# Patient Record
Sex: FEMALE | Race: WHITE
[De-identification: names, ages, dates, MRNs, and addresses within clinical notes are randomized per-mention and may not be internally consistent; named-entity substitution may affect disease eponyms.]

---

## 2017-04-16 ENCOUNTER — HOSPITAL ENCOUNTER (EMERGENCY)
Dept: HOSPITAL 47 - EC | Age: 32
Discharge: HOME | End: 2017-04-16
Payer: COMMERCIAL

## 2017-04-16 VITALS
TEMPERATURE: 99.4 F | RESPIRATION RATE: 18 BRPM | SYSTOLIC BLOOD PRESSURE: 121 MMHG | DIASTOLIC BLOOD PRESSURE: 78 MMHG | HEART RATE: 93 BPM

## 2017-04-16 DIAGNOSIS — F41.9: ICD-10-CM
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
## 2017-04-16 DIAGNOSIS — S92.331A: ICD-10-CM

## 2017-04-16 DIAGNOSIS — S92.341A: ICD-10-CM

## 2017-04-16 DIAGNOSIS — S92.351A: ICD-10-CM

## 2017-04-16 DIAGNOSIS — Z88.1: ICD-10-CM

## 2017-04-16 DIAGNOSIS — S82.831A: Primary | ICD-10-CM

## 2017-04-16 PROCEDURE — 29515 APPLICATION SHORT LEG SPLINT: CPT

## 2017-04-16 PROCEDURE — 99283 EMERGENCY DEPT VISIT LOW MDM: CPT

## 2017-04-16 NOTE — ED
Lower Extremity Injury HPI





- General


Chief Complaint: Extremity Injury, Lower


Stated Complaint: rt foot injury


Time Seen by Provider: 04/16/17 15:26


Source: patient, RN notes reviewed


Mode of arrival: wheelchair


Limitations: no limitations





- History of Present Illness


Initial Comments: 





31-year-old female presents emergency Department chief complaint right foot and 

ankle injury.  Patient states she's going on the stairs slipped twisting her 

ankle and injuring her distal foot.  Patient states that it is very painful 

unable to ambulate.  Patient denies any head injury no LOC.  Patient offers no 

other complaints.





- Related Data


 Home Medications











 Medication  Instructions  Recorded  Confirmed


 


Omeprazole [Omeprazole] 20 mg PO DAILY PRN 12/16/15 03/14/16


 


Sertraline [Zoloft] 50 mg PO HS 03/09/16 03/14/16








 Previous Rx's











 Medication  Instructions  Recorded


 


Acetaminophen-Codeine 300-30mg 2 tab PO Q6H PRN #30 tablet 03/14/16





[Tylenol #3]  


 


Hydrocodone/Acetaminophen [Norco 1 tab PO Q6HR PRN #20 tab 04/16/17





5-325]  











 Allergies











Allergy/AdvReac Type Severity Reaction Status Date / Time


 


nitrofurantoin AdvReac  numbness,ti Verified 04/16/17 15:22





[From Macrobid]   ngling  


 


nitrofurantoin AdvReac  numbness,ti Verified 04/16/17 15:22





macrocrystalline   ngling  





[From Macrobid]     














Review of Systems


ROS Statement: 


Those systems with pertinent positive or pertinent negative responses have been 

documented in the HPI.





ROS Other: All systems not noted in ROS Statement are negative.





Past Medical History


Past Medical History: No Reported History


History of Any Multi-Drug Resistant Organisms: MRSA


Date of last positivie culture/infection: 2009


MDRO Source:: right groin


Past Surgical History: Tubal Ligation


Past Psychological History: Anxiety


Smoking Status: Never smoker


Past Alcohol Use History: Rare


Past Drug Use History: None Reported





- Past Family History


  ** Mother


Additional Family Medical History / Comment(s): hypoglycemia





General Exam


Limitations: no limitations


General appearance: alert, in no apparent distress


Respiratory exam: Present: normal lung sounds bilaterally.  Absent: respiratory 

distress, wheezes, rales, rhonchi, stridor


Cardiovascular Exam: Present: regular rate, normal rhythm, normal heart sounds.

  Absent: systolic murmur, diastolic murmur, rubs, gallop, clicks


Extremities exam: Present: other (Right foot there is tenderness to the distal 

aspect, no obvious deformity mild swelling no ecchymosis neurovascular intact 

tenderness over right lateral malleolus)





Course





 Vital Signs











  04/16/17





  15:19


 


Temperature 99.4 F


 


Pulse Rate 93


 


Respiratory 18





Rate 


 


Blood Pressure 121/78


 


O2 Sat by Pulse 99





Oximetry 














Procedures





- Orthopedic Splinting/Casting


  ** Injury #1


Side: right


Lower Extremity Injury Location: ankle, foot


Lower Extremity Immobilizer: posterior splint


Other Orthopedic Equipment: crutches


Additional Comments: 





Patient's leg was neurovascularly intact before and after procedure





Medical Decision Making





- Medical Decision Making





31-year-old female presents emergency department for right foot and ankle 

injury.  Patient has fracture of her second third and fourth digit along with 

right fibula fracture.  Patient was placed on short leg splint.  Patient has no 

evidence of Lisfranc fracture.  Patient will be discharged follow-up with 

orthopedics patient was given prescription for crutches.





Disposition


Clinical Impression: 


 Foot fracture, right, Ankle fracture, right





Disposition: HOME SELF-CARE


Condition: Stable


Instructions:  Foot Fracture in Adults (ED)


Additional Instructions: 


Follow-up with orthopedics as directed.Please return to the Emergency 

Department if symptoms worsen or any other concerns.


Prescriptions: 


Hydrocodone/Acetaminophen [Norco 5-325] 1 tab PO Q6HR PRN #20 tab


 PRN Reason: Pain


Referrals: 


Robert King MD [Primary Care Provider] - 1-2 days


Jeffrey Bell MD [STAFF PHYSICIAN] - 1-2 days


Time of Disposition: 15:50

## 2017-04-16 NOTE — XR
EXAMINATION TYPE: XR foot complete RT

 

DATE OF EXAM: 4/16/2017 3:34 PM

 

COMPARISON: NONE

 

HISTORY: Fell down the stairs. Pain.

 

TECHNIQUE: 3 views

 

FINDINGS: There are transverse fractures of the necks of the third fourth and fifth metatarsal heads.
 There is no dislocation. There is a plantar calcaneal spur.

 

IMPRESSION: Multiple metatarsal head fractures.

## 2017-04-16 NOTE — XR
EXAMINATION TYPE: XR ankle complete RT

 

DATE OF EXAM: 4/16/2017 3:33 PM

 

COMPARISON: NONE

 

HISTORY: Fell down the stairs. Pain

 

TECHNIQUE: 3 views

 

FINDINGS: There is a small 5 mm nondisplaced chip fracture of the tip of the distal fibula. There is 
lateral soft tissue swelling. Ankle mortise is anatomic. There is mild calcaneal spurring.

 

IMPRESSION: Chip fracture of the distal fibula.

## 2017-04-28 ENCOUNTER — HOSPITAL ENCOUNTER (OUTPATIENT)
Dept: HOSPITAL 47 - OR | Age: 32
Discharge: HOME | End: 2017-04-28
Payer: COMMERCIAL

## 2017-04-28 VITALS — DIASTOLIC BLOOD PRESSURE: 83 MMHG | SYSTOLIC BLOOD PRESSURE: 139 MMHG | RESPIRATION RATE: 18 BRPM | HEART RATE: 122 BPM

## 2017-04-28 VITALS — BODY MASS INDEX: 38 KG/M2

## 2017-04-28 VITALS — TEMPERATURE: 99.6 F
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
## 2017-04-28 DIAGNOSIS — S92.331A: Primary | ICD-10-CM

## 2017-04-28 DIAGNOSIS — Z88.1: ICD-10-CM
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
## 2017-04-28 DIAGNOSIS — S82.64XD: ICD-10-CM

## 2017-04-28 DIAGNOSIS — S92.341A: ICD-10-CM

## 2017-04-28 DIAGNOSIS — Z79.891: ICD-10-CM

## 2017-04-28 DIAGNOSIS — Z79.1: ICD-10-CM

## 2017-04-28 DIAGNOSIS — S92.351A: ICD-10-CM

## 2017-04-28 PROCEDURE — 81025 URINE PREGNANCY TEST: CPT

## 2017-04-28 PROCEDURE — 73620 X-RAY EXAM OF FOOT: CPT

## 2017-04-28 PROCEDURE — 28476 PRQ SKEL FIX METAR FX W/MNPJ: CPT

## 2017-04-28 RX ADMIN — ONDANSETRON ONE MG: 2 INJECTION INTRAMUSCULAR; INTRAVENOUS at 11:35

## 2017-04-28 RX ADMIN — HYDROMORPHONE HYDROCHLORIDE PRN MG: 1 INJECTION, SOLUTION INTRAMUSCULAR; INTRAVENOUS; SUBCUTANEOUS at 15:28

## 2017-04-28 RX ADMIN — ONDANSETRON ONE MG: 2 INJECTION INTRAMUSCULAR; INTRAVENOUS at 15:32

## 2017-04-28 RX ADMIN — HYDROMORPHONE HYDROCHLORIDE PRN MG: 1 INJECTION, SOLUTION INTRAMUSCULAR; INTRAVENOUS; SUBCUTANEOUS at 13:35

## 2017-04-28 NOTE — P.OP
Date of Procedure: 04/28/17


Preoperative Diagnosis: 


Closed, displaced right third, fourth, and fifth metatarsal neck fractures


Postoperative Diagnosis: 


Same


Procedure(s) Performed: 


1.  Percutaneous reduction and pinning of right third metatarsal neck fracture


2.  Percutaneous reduction and pinning of right fourth metatarsal neck fracture


3.  Percutaneous reduction and pinning of right fifth metatarsal neck fracture


Anesthesia: LORE


Surgeon: Luis Brice


Assistant #1: Rosario Vela


Estimated Blood Loss (ml): 25


IV fluids (ml): 600


Pathology: none sent


Condition: stable


Disposition: PACU


Indications for Procedure: 


The patient is a 31-year-old female who was previously healthy presented to my 

office a week and a half ago following a fall down stairs.  She sustained 

isolated injuries to her right ankle and foot.  She was seen in the ER where x-

rays showed a nondisplaced distal fibula fracture and third, fourth, and fifth 

metatarsal neck fractures.  The patient was placed in a splint and follow-up 

was arranged in my office.  On review of the x-rays in our office the 

metatarsal neck fractures were angulated and displaced, particularly the fourth 

and fifth metatarsal neck fractures.  I do lengthy discussion with the patient 

and her  on treatment.  Due to the patient's young age, multiple 

metatarsal neck fractures, and angulation greater than 10 of her fourth and 

fifth metatarsal neck fracture I recommended percutaneous reduction and 

pinning.  We discussed the potential risks and complication of surgery 

including but not limited to risk of anesthesia, risk of delayed wound healing, 

risk of superficial infection, risk of deep infection, risk of pin site 

infection, risk of fracture malunion, risk of fracture nonunion, risk of 

chronic pain, risk of chronic swelling, risk of need for further surgery, and 

generalized to satisfaction with surgery.  The patient understands these risks 

and provided her verbal and written consent to go forward with surgery.


Description of Procedure: 


The patient was identified in preoperative holding and the correct operative 

extremity was marked with my initials.  The consent was reviewed and all 

questions were answered.  The patient was then brought back to the operating 

room.  She was transferred from the [facility] onto the operating room table and a 

general anesthetic was administered.  A tourniquet was applied to the proximal 

aspect of the right leg but was not inflated at all during the procedure.  All 

bony prominences were well-padded.  Preoperative antibiotics were administered.

  The patient's right leg was then prepped and draped in standard sterile 

fashion.  Prior to starting surgery timeout was performed identifying the 

correct patient, operative extremity, and procedure.  A sterile radiolucent 

triangle was placed under the knee and a stack of towels placed under the foot 

to facilitate intraoperative C-arm imaging.





I began by addressing the fifth metatarsal neck fracture.  C-arm was brought 

into marked out the distal fifth metatarsal.  A stab incision was made over the 

lateral aspect of the fifth metatarsal head.  A dental pick was placed through 

the stab incision to use as a reduction aid.  Once the fifth metatarsal head 

was aligned with the shaft a 0.0625 K wire was placed under the plantar aspect 

of the fifth toe and centered on the metatarsal head.  The K wire was driven 

through the metatarsal head, across the fracture and into the fifth metatarsal 

shaft.  The position of the K wire was checked on AP, oblique, and lateral C-

arm shots.





The fourth metatarsal neck fracture was then addressed.  A stab incision was 

made in the webspace between the fourth and fifth toe.  A dental pick was used 

to percutaneously reduce the fourth metatarsal head fragment.  Once it was 

aligned with the shaft a 0.0625 K wire was placed under the plantar aspect of 

the fourth toe and centered on the metatarsal head.  Under direct C-arm 

visualization the K wire was driven across the fracture site and into the 

fourth metatarsal shaft.  C-arm was used to verify reduction in the AP, oblique

, and lateral shots.





The third metatarsal neck fracture was then addressed.  A stab incision was 

made in the webspace between the third and fourth toe.  A dental pick was used 

to percutaneously reduce the third metatarsal head fragment.  Once it was 

aligned with the shaft a 0.0625 K wire was placed under the plantar aspect of 

the third toe and centered on the metatarsal head.  Under direct C-arm 

visualization the K wire was driven across the fracture site and into the third 

metatarsal shaft.  C-arm was used to verify reduction in the AP, oblique, and 

lateral shots.





After final C-arm shots were taken to verify reduction of the fractures and 

position of the K wires the K wires were bent and cut outside the skin.  

Protective caps were placed over all 3 K wires.  The stab incisions over the 

dorsal aspect of the foot were closed using interrupted 3-0 nylon horizontal 

mattress stitches.  A sterile dressing consisting of Betadine soaked Adaptic, 4 

x 4, and web roll was applied.  The patient was placed in a bulky Berumen type 

splint.  She was awoken from her anesthetic and transferred to the [facility].  She 

was brought to PACU in stable condition having tolerated the procedure well.

## 2017-04-28 NOTE — FL
EXAMINATION TYPE: FL guidance operating room, XR foot limited RT

 

DATE OF EXAM: 4/28/2017 2:41 PM

 

CLINICAL HISTORY: Right foot fracture third through fifth toes.

 

TECHNIQUE: Fluoroscopy. Intraoperative limited views right foot.

 

COMPARISON:  Right foot x-ray April 16, 2017..

 

FINDINGS:  Fluoroscopic guidance was provided during closed reduction external fixation procedure per
formed by Dr. Brice.  A total of 58 seconds of fluoroscopic time was utilized during the procedure
 and 2 spot intraoperative images are acquired.

 

Intraoperative images acquired show placement of 3 external K wires through oblique fracture deformit
ies of distal third through fifth metatarsals. Improved alignment is seen after reduction and fixatio
n.

 

IMPRESSION:  As Above.

## 2019-12-29 ENCOUNTER — HOSPITAL ENCOUNTER (EMERGENCY)
Dept: HOSPITAL 47 - EC | Age: 34
Discharge: HOME | End: 2019-12-29
Payer: COMMERCIAL

## 2019-12-29 VITALS — RESPIRATION RATE: 18 BRPM

## 2019-12-29 VITALS — DIASTOLIC BLOOD PRESSURE: 87 MMHG | HEART RATE: 89 BPM | SYSTOLIC BLOOD PRESSURE: 138 MMHG | TEMPERATURE: 98.1 F

## 2019-12-29 DIAGNOSIS — J40: Primary | ICD-10-CM

## 2019-12-29 DIAGNOSIS — Z88.1: ICD-10-CM

## 2019-12-29 PROCEDURE — 71046 X-RAY EXAM CHEST 2 VIEWS: CPT

## 2019-12-29 PROCEDURE — 87502 INFLUENZA DNA AMP PROBE: CPT

## 2019-12-29 PROCEDURE — 99284 EMERGENCY DEPT VISIT MOD MDM: CPT

## 2019-12-29 NOTE — ED
General Adult HPI





- General


Chief complaint: Upper Respiratory Infection


Stated complaint: URI


Time Seen by Provider: 12/29/19 02:56


Source: patient, RN notes reviewed, old records reviewed


Mode of arrival: ambulatory


Limitations: no limitations





- History of Present Illness


Initial comments: 


34-year-old female patient presents to the.  Chief complaint approximately 2 

weeks of cough, waxing and waning fevers congestion.  Patient recently completed

a course of amoxicillin for possible pneumonia.  Denies a chance of being 

pregnant secondary tubal ligation.  Denies any risk factors for PE.  The force 

that her back and her chest hurt while coughing, denies any chest pain at rest. 

Denies any other complaints at this time.





Systemic: Pt denies fatigue, fever/chills, rash. Pt denies weakness, night 

sweats, weight loss. 


Neuro: Pt denies headache, visual disturbances, syncope or pre-syncope.


HEENT: Pt denies ocular discharge or irritation, otalgia, rhinorrhea, 

pharyngitis or notable lymphadenopathy. 


Cardiopulmonary: Pt denies chest pain, SOB, heart palpitations, dyspnea on 

exertion.  


Abdominal/GI: Pt denies abdominal pain, n/v/d. 


: Pt denies dysuria, burning w/ urination, frequency/urgency. Denies new onset

urinary or bowel incontinence.  


MSK: Pt denies myalgia, loss of strength or function in extremities. 


Neuro: Pt denies new onset weakness, paresthesias. 








- Related Data


                                Home Medications











 Medication  Instructions  Recorded  Confirmed


 


Ibuprofen [Motrin] 600 mg PO Q6HR PRN 04/26/17 04/26/17








                                  Previous Rx's











 Medication  Instructions  Recorded


 


Hydrocodone/Acetaminophen [Norco 1 tab PO Q6HR PRN #20 tab 04/16/17





5-325]  


 


Docusate [Colace] 100 mg PO BID #28 capsule 04/28/17


 


HYDROcodone/APAP 5-325MG [Norco 1 - 2 tab PO Q6HR PRN #50 tab 04/28/17





5-325]  


 


Sulfamethox-Tmp 800-160Mg [Bactrim 1 tab PO Q12HR #28 tab 04/28/17





-160 mg]  


 


Albuterol Inhaler [Ventolin Hfa 1 - 2 puff INHALATION Q4-6H PRN #1 12/29/19





Inhaler] inhaler 


 


predniSONE 50 mg PO DAILY #4 tab 12/29/19











                                    Allergies











Allergy/AdvReac Type Severity Reaction Status Date / Time


 


nitrofurantoin AdvReac  numbness,ti Verified 04/26/17 09:10





[From Macrobid]   ngling  


 


nitrofurantoin AdvReac  numbness,ti Verified 04/26/17 09:10





macrocrystalline   ngling  





[From Macrobid]     














Review of Systems


ROS Statement: 


Those systems with pertinent positive or pertinent negative responses have been 

documented in the HPI.





ROS Other: All systems not noted in ROS Statement are negative.





Past Medical History


Past Medical History: GERD/Reflux, Musculoskeletal Disorder


Additional Past Medical History / Comment(s): FRACTURES 3,4,5 TOES RT FOOT.


History of Any Multi-Drug Resistant Organisms: MRSA


Date of last positivie culture/infection: 2009


MDRO Source:: right groin


Past Surgical History: Tubal Ligation


Past Anesthesia/Blood Transfusion Reactions: Family History of Problems w/ 

Anesthesia


Additional Past Anesthesia/Blood Transfusion Reaction / Comment(s): MOTHER HAS 

SEVERE PONV.


Past Psychological History: Depression


Smoking Status: Never smoker





- Past Family History


  ** Father


Family Medical History: Cancer





  ** Mother


Additional Family Medical History / Comment(s): hypoglycemia





General Exam





- General Exam Comments


Initial Comments: 


Constitutional: NAD, AOX3, Pt has pleasant affect. 


HEENT: NC/AT, trachea midline, neck supple, no lymphadenopathy. Posterior 

pharynx non erythematous, without exudates. External ears appear normal, without

discharge. Mucous membranes moist. Eyes PERRLA, EOM intact. There is no scleral 

icterus. No pallor noted. 


Cardiopulmonary: RRR, no murmurs, rubs or gallops, no JVD noted. Lungs CTAB in 

anterior and posterior fields. No peripheral edema. 


Abdominal exam: Abdomen soft and non-distended. Abdomen non-tender to palpation 

in all 4 quadrants. Bowel sounds active in LLQ. No hepatosplenomegaly. No 

ecchymosis


Neuro: CN II-XII grossly intact. No nuchal rigidity. No raccon eyes, no stafford 

sign, no hemotympanum. No cervical spinal tenderness. 


MSK: No posterior calf tenderness bilaterally, homans sign negative bilaterally.

Posterior tibialis and radial pulse +2 bilaterally. Sensation intact in upper 

and lower extremities. Full active ROM in upper and lower extremities, 5/5 

stregnth. 





Limitations: no limitations





Course





                                   Vital Signs











  12/29/19





  01:26


 


Temperature 98.6 F


 


Pulse Rate 91


 


Respiratory 18





Rate 


 


Blood Pressure 153/92


 


O2 Sat by Pulse 97





Oximetry 














Medical Decision Making





- Medical Decision Making





34-year-old female patient presents to ED for chief complaint approximately 2 

weeks of cough.  Patient will signs are stable, afebrile.  Physical exam didn't 

display acute pathology.  Lungs clear to auscultation bilaterally.  Chest x-ray 

is negative, influenza negative.  Patient is PERC negative.  Patient likely 

dealing with a viral bronchitis examined him.  We'll discharge with steroids and

when necessary breathing treatments.  Will follow up with primary care for a 

trial return to ER if condition worsens.  Case discussed with Dr. Santiago. 








- Lab Data





                                   Lab Results











  12/29/19 Range/Units





  01:30 


 


Influenza Type A RNA  Not Detected  (Not Detectd)  


 


Influenza Type B (PCR)  Not Detected  (Not Detectd)  














Disposition


Clinical Impression: 


 Cough, Bronchitis





Disposition: HOME SELF-CARE


Condition: Stable


Instructions (If sedation given, give patient instructions):  Acute Cough (ED), 

Acute Bronchitis (ED)


Additional Instructions: 





Take medications as directed.  Use inhaler as needed.  Follow-up with primary 

care provider tomorrow.  Return to ER if condition worsens.








Prescriptions: 


predniSONE 50 mg PO DAILY #4 tab


Albuterol Inhaler [Ventolin Hfa Inhaler] 1 - 2 puff INHALATION Q4-6H PRN #1 

inhaler


 PRN Reason: Cough


Is patient prescribed a controlled substance at d/c from ED?: No


Referrals: 


Robert King MD [Primary Care Provider] - 1-2 days

## 2019-12-29 NOTE — XR
EXAMINATION TYPE: XR chest 2V

 

DATE OF EXAM: 12/29/2019

 

COMPARISON: NONE

 

HISTORY: Cough

 

TECHNIQUE: 2 views

 

FINDINGS: Heart and mediastinum are normal. Lungs are clear. Diaphragm is normal. Bony thorax appears
 normal.

 

IMPRESSION: Normal chest

## 2023-06-21 ENCOUNTER — HOSPITAL ENCOUNTER (INPATIENT)
Dept: HOSPITAL 47 - EC | Age: 38
LOS: 2 days | Discharge: HOME | DRG: 53 | End: 2023-06-23
Attending: HOSPITALIST | Admitting: HOSPITALIST
Payer: COMMERCIAL

## 2023-06-21 DIAGNOSIS — Z86.14: ICD-10-CM

## 2023-06-21 DIAGNOSIS — Z88.8: ICD-10-CM

## 2023-06-21 DIAGNOSIS — G40.409: Primary | ICD-10-CM

## 2023-06-21 DIAGNOSIS — Z91.040: ICD-10-CM

## 2023-06-21 DIAGNOSIS — E66.01: ICD-10-CM

## 2023-06-21 DIAGNOSIS — S01.512A: ICD-10-CM

## 2023-06-21 DIAGNOSIS — F32.A: ICD-10-CM

## 2023-06-21 DIAGNOSIS — Z80.8: ICD-10-CM

## 2023-06-21 DIAGNOSIS — F41.9: ICD-10-CM

## 2023-06-21 DIAGNOSIS — Z88.2: ICD-10-CM

## 2023-06-21 DIAGNOSIS — Z79.899: ICD-10-CM

## 2023-06-21 LAB
ALBUMIN SERPL-MCNC: 4.8 G/DL (ref 3.5–5)
ALP SERPL-CCNC: 97 U/L (ref 38–126)
ALT SERPL-CCNC: 28 U/L (ref 4–34)
ANION GAP SERPL CALC-SCNC: 13 MMOL/L
APAP SPEC-MCNC: <10 UG/ML
AST SERPL-CCNC: 31 U/L (ref 14–36)
BASOPHILS # BLD AUTO: 0.1 K/UL (ref 0–0.2)
BASOPHILS NFR BLD AUTO: 0 %
BUN SERPL-SCNC: 16 MG/DL (ref 7–17)
CALCIUM SPEC-MCNC: 9.5 MG/DL (ref 8.4–10.2)
CHLORIDE SERPL-SCNC: 103 MMOL/L (ref 98–107)
CO2 SERPL-SCNC: 23 MMOL/L (ref 22–30)
EOSINOPHIL # BLD AUTO: 0.1 K/UL (ref 0–0.7)
EOSINOPHIL NFR BLD AUTO: 1 %
ERYTHROCYTE [DISTWIDTH] IN BLOOD BY AUTOMATED COUNT: 5.06 M/UL (ref 3.8–5.4)
ERYTHROCYTE [DISTWIDTH] IN BLOOD: 13.6 % (ref 11.5–15.5)
GLUCOSE SERPL-MCNC: 98 MG/DL (ref 74–99)
HCT VFR BLD AUTO: 43.1 % (ref 34–46)
HGB BLD-MCNC: 14.5 GM/DL (ref 11.4–16)
LYMPHOCYTES # SPEC AUTO: 1.5 K/UL (ref 1–4.8)
LYMPHOCYTES NFR SPEC AUTO: 11 %
MAGNESIUM SPEC-SCNC: 2.3 MG/DL (ref 1.6–2.3)
MCH RBC QN AUTO: 28.6 PG (ref 25–35)
MCHC RBC AUTO-ENTMCNC: 33.6 G/DL (ref 31–37)
MCV RBC AUTO: 85.2 FL (ref 80–100)
MONOCYTES # BLD AUTO: 0.3 K/UL (ref 0–1)
MONOCYTES NFR BLD AUTO: 2 %
NEUTROPHILS # BLD AUTO: 12.1 K/UL (ref 1.3–7.7)
NEUTROPHILS NFR BLD AUTO: 86 %
PLATELET # BLD AUTO: 250 K/UL (ref 150–450)
POTASSIUM SERPL-SCNC: 3.9 MMOL/L (ref 3.5–5.1)
PROT SERPL-MCNC: 8.2 G/DL (ref 6.3–8.2)
SALICYLATES SERPL-MCNC: <1 MG/DL
SODIUM SERPL-SCNC: 139 MMOL/L (ref 137–145)
WBC # BLD AUTO: 14 K/UL (ref 3.8–10.6)

## 2023-06-21 PROCEDURE — 80143 DRUG ASSAY ACETAMINOPHEN: CPT

## 2023-06-21 PROCEDURE — 70553 MRI BRAIN STEM W/O & W/DYE: CPT

## 2023-06-21 PROCEDURE — 80053 COMPREHEN METABOLIC PANEL: CPT

## 2023-06-21 PROCEDURE — 93005 ELECTROCARDIOGRAM TRACING: CPT

## 2023-06-21 PROCEDURE — 80320 DRUG SCREEN QUANTALCOHOLS: CPT

## 2023-06-21 PROCEDURE — 80306 DRUG TEST PRSMV INSTRMNT: CPT

## 2023-06-21 PROCEDURE — 70450 CT HEAD/BRAIN W/O DYE: CPT

## 2023-06-21 PROCEDURE — 36415 COLL VENOUS BLD VENIPUNCTURE: CPT

## 2023-06-21 PROCEDURE — 83735 ASSAY OF MAGNESIUM: CPT

## 2023-06-21 PROCEDURE — 84100 ASSAY OF PHOSPHORUS: CPT

## 2023-06-21 PROCEDURE — 85025 COMPLETE CBC W/AUTO DIFF WBC: CPT

## 2023-06-21 PROCEDURE — 95816 EEG AWAKE AND DROWSY: CPT

## 2023-06-21 PROCEDURE — 80179 DRUG ASSAY SALICYLATE: CPT

## 2023-06-21 RX ADMIN — ACETAMINOPHEN PRN MG: 325 TABLET, FILM COATED ORAL at 23:28

## 2023-06-21 RX ADMIN — CEFAZOLIN SCH MLS/HR: 330 INJECTION, POWDER, FOR SOLUTION INTRAMUSCULAR; INTRAVENOUS at 22:16

## 2023-06-21 NOTE — CT
EXAMINATION TYPE: CT brain wo con

CT DLP: 1098.4 mGycm, Automated exposure control for dose reduction was used.

 

DATE OF EXAM: 6/21/2023 6:25 PM

 

COMPARISON: None.

 

CLINICAL INDICATION:Female, 37 years old with history of seizure activity, Seizure activity

 

TECHNIQUE: 

Brain: Axial CT images of the brain were obtained with coronal and sagittal reformats created and rev
iewed.

Contrast used: None.

Oral contrast used: None.

 

FINDINGS:

 

Brain:

Extra-axial spaces: No abnormal extra-axial fluid collections.

Ventricular system: Within normal limits

Cerebral parenchyma: No acute intraparenchymal hemorrhage or mass effect.  The gray-white junction is
 well differentiated.     

Cerebellum: Unremarkable.

Mass effect: No evidence of midline shift.

Intracranial vasculature: unremarkable

Soft tissues: Normal.

Calvarium/osseous structures: No depressed skull fracture.

Paranasal sinuses and mastoid air cells: Mild scattered paranasal sinus disease.

Visualized orbits: Orbital contents are intact.

 

 

IMPRESSION:

No acute intracranial process.

## 2023-06-21 NOTE — ED
Seizure HPI





- General


Chief Complaint: Seizure


Stated Complaint: seizure


Time Seen by Provider: 06/21/23 17:26


Source: patient, RN notes reviewed, old records reviewed


Mode of arrival: ambulatory


Limitations: no limitations





- History of Present Illness


Initial Comments: 





This is a 37-year-old female DF for evaluation patient resents today for 

evaluation regards to seizure history of no prior seizures.  Patient presents 

today for first seizure, patient is on multiple medications that could lead her 

to have seizures per patient.  Patient has no other complaints.  Patient has had

some voluntary or involuntary movements lately.  Family medical with concern for

possible seizures.  Patient didn't bite her tongue today, seizure was witnessed 

by her son


MD Complaint: seizure, shaking


-: minutes(s)


Description of Episode: loss of consciousness, tonic-clonic movement, post-event

confusion, other (Tongue laceration)


-: minutes(s)


Witnessed: yes - by bystander


Trauma: Yes


Seizure History: none


Possible Precipitating Event: none


Associated Symptoms: confusion


Treatments Prior to Arrival: none





- Related Data


                                Home Medications











 Medication  Instructions  Recorded  Confirmed


 


buPROPion XL [Wellbutrin XL] 150 mg PO HS 10/31/22 06/21/23


 


Albuterol Inhaler [Ventolin Hfa 1 - 2 puff INHALATION RT-Q6H PRN 06/21/23 06/21/23





Inhaler]   


 


Cetirizine HCl 10 mg PO HS 06/21/23 06/21/23


 


FLUoxetine HCL [PROzac] 40 mg PO HS 06/21/23 06/21/23


 


Montelukast [Singulair] 10 mg PO HS 06/21/23 06/21/23


 


Semaglutide [Wegovy] 0.5 mg SQ FR 06/21/23 06/21/23











                                    Allergies











Allergy/AdvReac Type Severity Reaction Status Date / Time


 


sulfamethoxazole Allergy  Rash/Hives Verified 06/21/23 19:51





[From Bactrim]     


 


trimethoprim [From Bactrim] Allergy  Rash/Hives Verified 06/21/23 19:51


 


latex AdvReac  Itching Verified 06/21/23 19:51


 


nitrofurantoin AdvReac  numbness,ti Verified 06/21/23 19:51





[From Macrobid]   ngling  


 


nitrofurantoin AdvReac  numbness,ti Verified 06/21/23 19:51





macrocrystalline   ngling  





[From Macrobid]     














Review of Systems


ROS Statement: 


Those systems with pertinent positive or pertinent negative responses have been 

documented in the HPI.





ROS Other: All systems not noted in ROS Statement are negative.





Past Medical History


Past Medical History: GERD/Reflux, Musculoskeletal Disorder


Additional Past Medical History / Comment(s): FRACTURES 3,4,5 TOES RT FOOT.


History of Any Multi-Drug Resistant Organisms: MRSA


Date of last positivie culture/infection: 2009


MDRO Source:: right groin


Past Surgical History: Orthopedic Surgery, Tubal Ligation


Additional Past Surgical History / Comment(s): reconstrution of rt foot


Past Anesthesia/Blood Transfusion Reactions: Family History of Problems w/ 

Anesthesia


Additional Past Anesthesia/Blood Transfusion Reaction / Comment(s): MOTHER HAS 

SEVERE PONV.


Past Psychological History: Anxiety, Depression


Smoking Status: Never smoker





- Past Family History


  ** Father


Family Medical History: Cancer





  ** Mother


Additional Family Medical History / Comment(s): hypoglycemia





General Exam


Limitations: no limitations


General appearance: alert, in no apparent distress


Head exam: Present: atraumatic, normocephalic, normal inspection


Eye exam: Present: normal appearance, PERRL, EOMI.  Absent: scleral icterus, 

conjunctival injection, periorbital swelling


ENT exam: Present: normal exam, mucous membranes moist


Neck exam: Present: normal inspection.  Absent: tenderness, meningismus, lym

phadenopathy


Respiratory exam: Present: normal lung sounds bilaterally.  Absent: respiratory 

distress, wheezes, rales, rhonchi, stridor


Cardiovascular Exam: Present: regular rate, normal rhythm, normal heart sounds. 

Absent: systolic murmur, diastolic murmur, rubs, gallop, clicks


GI/Abdominal exam: Present: soft, normal bowel sounds.  Absent: distended, 

tenderness, guarding, rebound, rigid


Extremities exam: Present: normal inspection, full ROM, normal capillary refill.

 Absent: tenderness, pedal edema, joint swelling, calf tenderness


Back exam: Present: normal inspection


Neurological exam: Present: alert, oriented X3, CN II-XII intact


Psychiatric exam: Present: normal affect, normal mood


Skin exam: Present: warm, dry, intact, normal color.  Absent: rash





Course


                                   Vital Signs











  06/21/23 06/21/23 06/21/23





  15:11 17:38 22:19


 


Temperature 97.4 F L 98.6 F 98.5 F


 


Pulse Rate 117 H 108 H 97


 


Respiratory 20 18 16





Rate   


 


Blood Pressure 161/87 146/90 109/60


 


O2 Sat by Pulse 99 98 98





Oximetry   














- Reevaluation(s)


Reevaluation #1: 





06/21/23 22:44


Medical records reviewed


Reevaluation #2: 





06/21/23 22:44


No recurrent seizures


Reevaluation #3: 





06/21/23 22:44


Patient informed results questions have been answered


Reevaluation #4: 





06/21/23 22:44


Was pt. sent in by a medical professional or institution?


@  -no


Did you speak to anyone other than the patient for history?  


@  -no


Did you review nursing and triage notes? 


@  -agree


Were old charts reviewed? 


@  -no


Differential Diagnosis? 


@  -prior


EKG interpreted by me (3pts min.)?


@  -yes


X-rays interpreted by me (1pt min.)?


@  -no


CT interpreted by me (1pt min.)?


@  -no


U/S interpreted by me (1pt. min.)?


@  -no


What testing was considered but not performed? (CT, X-rays, U/S, labs)? Why?


@  -no


What meds were considered but not given? Why?


@  -no


Did you discuss the management of the patient with other professionals?


@  -no


Did you reconcile home meds?


@  -no


Was smoking cessation discussed for >3mins.?


@  -no


Was critical care preformed (if so, how long)?


@  -no


Were there social determinants of health that impacted care today? How? 

(Homelessness, low income, unemployed, alcoholism, drug addiction, 

transportation, low edu. Level, literacy, decrease access to med. care, senior living, 

rehab)?


@  -no


Was there de-escalation of care discussed even if they declined? (Discuss DNR or

withdrawal of care, Hospice)?


@  -no


What co-morbidities impacted this encounter? (DM, HTN, Smoking, COPD, CAD, 

Cancer, CVA, Hep., AIDS, mental health diagnosis, sleep apnea, morbid obesity)?


@  -none


Was patient admitted / discharged?


@  -


Undiagnosed new problem with uncertain prognosis?


@  -no


Drug Therapy requiring intensive monitoring for toxicity (Heparin, Nitro, 

Insulin, Cardizem)?


@  -no


Were any procedures done?


@  -no


Diagnosis/symptom?


@  -


Acute, or Chronic, or Acute on Chronic?


@  -no


Uncomplicated (without systemic symptoms) or Complicated (systemic symptoms)?


@  -uncomplicated


Side effects of treatment?


@  -no


Exacerbation, Progression, or Severe Exacerbation]


@  -no


Poses a threat to life or bodily function?


@  -no 


Reevaluation #5: 





06/21/23 22:44


Differential Seizure:


Recurrent seizure disorder, febrile seizure, alcohol withdrawal, stimulants, 

meningitis, encephalitis, intercranial hemorrhage, intracranial tumor, stroke, 

eclampsia, thyrotoxicosis, hypocalcemia, hyponatremia, hypernatremia, 

hypomagnesemia, psychogenic, this is not meant to be an all-inclusive list. 





Medical Decision Making





- Medical Decision Making





37 female DEL with new onset seizure we'll admit for neurology consultation





- Lab Data


Result diagrams: 


                                 06/21/23 18:29





                                 06/21/23 18:29


                                   Lab Results











  06/21/23 06/21/23 06/21/23 Range/Units





  18:29 18:29 18:29 


 


WBC  14.0 H    (3.8-10.6)  k/uL


 


RBC  5.06    (3.80-5.40)  m/uL


 


Hgb  14.5    (11.4-16.0)  gm/dL


 


Hct  43.1    (34.0-46.0)  %


 


MCV  85.2    (80.0-100.0)  fL


 


MCH  28.6    (25.0-35.0)  pg


 


MCHC  33.6    (31.0-37.0)  g/dL


 


RDW  13.6    (11.5-15.5)  %


 


Plt Count  250    (150-450)  k/uL


 


MPV  7.3    


 


Neutrophils %  86    %


 


Lymphocytes %  11    %


 


Monocytes %  2    %


 


Eosinophils %  1    %


 


Basophils %  0    %


 


Neutrophils #  12.1 H    (1.3-7.7)  k/uL


 


Lymphocytes #  1.5    (1.0-4.8)  k/uL


 


Monocytes #  0.3    (0-1.0)  k/uL


 


Eosinophils #  0.1    (0-0.7)  k/uL


 


Basophils #  0.1    (0-0.2)  k/uL


 


Sodium    139  (137-145)  mmol/L


 


Potassium    3.9  (3.5-5.1)  mmol/L


 


Chloride    103  ()  mmol/L


 


Carbon Dioxide    23  (22-30)  mmol/L


 


Anion Gap    13  mmol/L


 


BUN    16  (7-17)  mg/dL


 


Creatinine    0.86  (0.52-1.04)  mg/dL


 


Est GFR (CKD-EPI)AfAm    >90  (>60 ml/min/1.73 sqM)  


 


Est GFR (CKD-EPI)NonAf    89  (>60 ml/min/1.73 sqM)  


 


Glucose    98  (74-99)  mg/dL


 


Calcium    9.5  (8.4-10.2)  mg/dL


 


Magnesium    2.3  (1.6-2.3)  mg/dL


 


Total Bilirubin    0.4  (0.2-1.3)  mg/dL


 


AST    31  (14-36)  U/L


 


ALT    28  (4-34)  U/L


 


Alkaline Phosphatase    97  ()  U/L


 


Total Protein    8.2  (6.3-8.2)  g/dL


 


Albumin    4.8  (3.5-5.0)  g/dL


 


Salicylates    <1.0  mg/dL


 


Urine Opiates Screen   Not Detected   (NotDetected)  


 


Ur Oxycodone Screen   Not Detected   (NotDetected)  


 


Urine Methadone Screen   Not Detected   (NotDetected)  


 


Ur Propoxyphene Screen   Not Detected   (NotDetected)  


 


Acetaminophen    <10.0  ug/mL


 


Ur Barbiturates Screen   Not Detected   (NotDetected)  


 


U Tricyclic Antidepress   Not Detected   (NotDetected)  


 


Ur Phencyclidine Scrn   Not Detected   (NotDetected)  


 


Ur Amphetamines Screen   Not Detected   (NotDetected)  


 


U Methamphetamines Scrn   Not Detected   (NotDetected)  


 


U Benzodiazepines Scrn   Detected H   (NotDetected)  


 


Urine Cocaine Screen   Not Detected   (NotDetected)  


 


U Marijuana (THC) Screen   Not Detected   (NotDetected)  


 


Serum Alcohol    <10  mg/dL














- EKG Data


-: EKG Interpreted by Me (EKG sinus tach 107.   QRS 96 QTc 412)





- Radiology Data


Radiology results: report reviewed (CT brain is negative for acute disease), 

image reviewed





Disposition


Clinical Impression: 


 New onset seizure





Disposition: ADMITTED AS IP TO THIS HOSP


Condition: Good


Is patient prescribed a controlled substance at d/c from ED?: No


Time of Disposition: 21:40

## 2023-06-22 LAB
ALBUMIN SERPL-MCNC: 3.7 G/DL (ref 3.5–5)
ALP SERPL-CCNC: 79 U/L (ref 38–126)
ALT SERPL-CCNC: 24 U/L (ref 4–34)
ANION GAP SERPL CALC-SCNC: 9 MMOL/L
AST SERPL-CCNC: 36 U/L (ref 14–36)
BASOPHILS # BLD AUTO: 0 K/UL (ref 0–0.2)
BASOPHILS NFR BLD AUTO: 0 %
BUN SERPL-SCNC: 12 MG/DL (ref 7–17)
CALCIUM SPEC-MCNC: 8.5 MG/DL (ref 8.4–10.2)
CHLORIDE SERPL-SCNC: 105 MMOL/L (ref 98–107)
CO2 SERPL-SCNC: 24 MMOL/L (ref 22–30)
EOSINOPHIL # BLD AUTO: 0.1 K/UL (ref 0–0.7)
EOSINOPHIL NFR BLD AUTO: 1 %
ERYTHROCYTE [DISTWIDTH] IN BLOOD BY AUTOMATED COUNT: 4.53 M/UL (ref 3.8–5.4)
ERYTHROCYTE [DISTWIDTH] IN BLOOD: 13.7 % (ref 11.5–15.5)
GLUCOSE SERPL-MCNC: 86 MG/DL (ref 74–99)
HCT VFR BLD AUTO: 39.2 % (ref 34–46)
HGB BLD-MCNC: 13.2 GM/DL (ref 11.4–16)
LYMPHOCYTES # SPEC AUTO: 2.7 K/UL (ref 1–4.8)
LYMPHOCYTES NFR SPEC AUTO: 30 %
MAGNESIUM SPEC-SCNC: 2.2 MG/DL (ref 1.6–2.3)
MCH RBC QN AUTO: 29.1 PG (ref 25–35)
MCHC RBC AUTO-ENTMCNC: 33.7 G/DL (ref 31–37)
MCV RBC AUTO: 86.5 FL (ref 80–100)
MONOCYTES # BLD AUTO: 0.3 K/UL (ref 0–1)
MONOCYTES NFR BLD AUTO: 3 %
NEUTROPHILS # BLD AUTO: 5.8 K/UL (ref 1.3–7.7)
NEUTROPHILS NFR BLD AUTO: 64 %
PLATELET # BLD AUTO: 174 K/UL (ref 150–450)
POTASSIUM SERPL-SCNC: 3.8 MMOL/L (ref 3.5–5.1)
PROT SERPL-MCNC: 6.4 G/DL (ref 6.3–8.2)
SODIUM SERPL-SCNC: 138 MMOL/L (ref 137–145)
WBC # BLD AUTO: 9 K/UL (ref 3.8–10.6)

## 2023-06-22 RX ADMIN — CEFAZOLIN SCH: 330 INJECTION, POWDER, FOR SOLUTION INTRAMUSCULAR; INTRAVENOUS at 05:31

## 2023-06-22 RX ADMIN — ACETAMINOPHEN PRN MG: 325 TABLET, FILM COATED ORAL at 22:36

## 2023-06-22 RX ADMIN — CEFAZOLIN SCH: 330 INJECTION, POWDER, FOR SOLUTION INTRAMUSCULAR; INTRAVENOUS at 23:21

## 2023-06-22 NOTE — P.CN
Psychiatric Consult





- .


Consult date: 06/22/23


Consult:: 





06/22/23 13:45


IDENTIFYING DATA: This patient is a , unemployed, 37-year-old  

female with significant history of depression who presents to the hospital on 

06/21/2023 for new onset seizure.





HISTORY OF PRESENT ILLNESS: The patient presented to the hospital on 06/21/2023,

after a new onset seizure.  The patient reported that at approximately 2:40 PM 

yesterday, she felt that she passed out.  However she came to in a few minutes 

with blood on her chin and blood in her tongue after a reported seizure.  The 

patient reports that when she came to, her daughter was worried and expressed 

concern that "davonte was dying."  The patient denies any enuresis.  She does 

report some postictal confusion. 


The patient reports that this is her first seizure.  She denies any history of 

substance abuse.  She reports no history of head trauma. 


In regards to her mood, the patient reports that she does have a significant 

history of depression that started after her brother passed away in 2018.  

However, she does not report any significant symptoms of depression at this 

time.  She remains future and goal oriented with a strong desire to live for 

herself and for her family.  She is denying any suicidal or homicidal ideation, 

intention, and/or plan.  She reports no prior attempts at suicide.  She denies 

any auditory or visual hallucinations.  She denies any paranoia or other 

delusions.





PAST PSYCHIATRIC HISTORY: Patient has a history of depression.  Patient is 

currently prescribed regimen of Prozac and Wellbutrin.  Patient denies any 

previous psychiatric hospitalizations. Patient denies any psychiatric outpatient

follow-up. Patient denies any history of suicide attempts in the past.





PAST MEDICAL HISTORY:  


Past Medical History: GERD/Reflux, Musculoskeletal Disorder


Additional Past Medical History / Comment(s): FRACTURES 3,4,5 TOES RT FOOT.


History of Any Multi-Drug Resistant Organisms: MRSA


Date of last positivie culture/infection: 2009


MDRO Source:: right groin


Past Surgical History: Orthopedic Surgery, Tubal Ligation


Additional Past Surgical History / Comment(s): reconstrution of rt foot


Past Anesthesia/Blood Transfusion Reactions: Family History of Problems w/ 

Anesthesia


Additional Past Anesthesia/Blood Transfusion Reaction / Comment(s): MOTHER HAS 

SEVERE PONV.


Past Psychological History: Anxiety, Depression


Smoking Status: Never smoker








ALLERGIES: 


                                    Allergies











Allergy/AdvReac Type Severity Reaction Status Date / Time


 


sulfamethoxazole Allergy  Rash/Hives Verified 06/21/23 19:51





[From Bactrim]     


 


trimethoprim [From Bactrim] Allergy  Rash/Hives Verified 06/21/23 19:51


 


latex AdvReac  Itching Verified 06/21/23 19:51


 


nitrofurantoin AdvReac  numbness,ti Verified 06/21/23 19:51





[From Macrobid]   ngling  


 


nitrofurantoin AdvReac  numbness,ti Verified 06/21/23 19:51





macrocrystalline   ngling  





[From Macrobid]     











CHEMICAL DEPENDENCY HISTORY: The patient denies any tobacco, significant 

alcohol, marijuana, or illicit drug use.





FAMILY PSYCHIATRIC/SUBSTANCE USE HISTORY: The patient reports that her mother 

had a psychotic break after the death of her father.  No other reported 

psychiatric history.





SOCIAL HISTORY: Patient was born in [place] and raised in [place].  She is 

 to her  Luis for the past 11 years.  They have 3 children 

together ages 15, 9, and 7.





MENTAL STATUS EXAM: 


General Appearance: Patient appears to be stated age is alert, pleasant, and 

cooperative. Patient appears to have fair hygiene and grooming wearing hospital 

gown with fair eye contact.


Behavior: Patient is calmly lying in bed without any agitated behavior.


Speech: Patient's speech is fluent and nonpressured. 


Mood/Affect: Patient reports their mood is "doing pretty good", affect is 

congruent and euthymic


Suicidality/Homicidality:  Patient denies any suicidal or homicidal ideation, 

intention, and/or plan.


Perceptions: Patient denies any visual hallucinations and denies any auditory 

hallucinations  


Though content/process: There is no evidence of any delusional thought content 

and thought process is linear and goal-directed. 


Memory and concentration: AOX3, grossly intact for the purposes of this session.

Can spell "WORLD" backwards


Judgment and insight: Fair





IMPRESSIONS: 


Major depressive disorder


New-onset seizure suspect secondary to Wellbutrin





PLAN: 


-At this time patient DOES NOT meet criteria for inpatient psychiatric 

admission.  Patient does not present an imminent risk of harm to self or others.

 She is not overtly manic or psychotic.


-Would recommend the following medication changes/additions: 


After discussion with the patient, we have agreed to discontinue Wellbutrin due 

to its risk of lowering seizure threshold.  The patient does not wish to start 

any new medication to augment her antidepressant at this time.  She elected to 

trial being on Prozac alone.


Continue Prozac 40 mg by mouth at bedtime for depression/anxiety


-Patient does not require one-to-one sitter. 


-Psychiatry will sign off at this point, please contact with any questions.








06/22/23 13:45

## 2023-06-22 NOTE — HP
HISTORY AND PHYSICAL



CHIEF COMPLAINT:

Seizure disorder.



HISTORY OF PRESENT ILLNESS:

This is a 37-year-old woman with a past medical history of GERD, was noted to have some

jerking reaction by the family yesterday.  At that time, her  was at work.  The

patient had generalized tonic-clonic seizure.  The patient bit the tongue and was

bleeding.  The patient came to [facility] and was admitted for further

evaluation and treatment.  There is no history of any fever, rigors, or chills at this

time.



Dr. King in the outpatient setting was cutting down the dose of bupropion and as well as

Prozac.



PAST MEDICAL HISTORY:

Include MRSA and GERD.  Rest of the history and rest of the chart are also reviewed.



HOME MEDICATIONS:

Reviewed include Singulair.  Rest of the medications are noted.



ALLERGIES:

Reviewed include Bactrim.  Rest of the allergies are noted.



FAMILY HISTORY:

History of hypoglycemia.



SOCIAL HISTORY:

No history of smoking.  Occasional alcohol intake.



REVIEW OF SYSTEMS:

Fourteen-point review is negative except as mentioned earlier.



PHYSICAL EXAMINATION:

VITAL SIGNS:  Pulse 84, blood pressure 135/80, respirations 15.

HEENT:  Conjunctivae are normal.

NECK:  No jugular venous distention.

CARDIOVASCULAR:  S1 and S2 muffled.

RESPIRATORY:  Breath sounds diminished at the bases.



ABDOMEN:  Soft and nontender.

LEGS:  No edema.  No swelling.

NERVOUS SYSTEM:  No focal deficits.

SKIN:  No ulcers or rashes.

JOINTS:  No active deforming arthropathy.



LABORATORY DATA:

Noted.



ASSESSMENT:

1. Acute tonic-clonic seizure disorder.

2. Increased WBC, possibly reactive.

3. History of methicillin-resistant Staphylococcus aureus.

4. Anxiety and depression.

5. Multiple medical issues.



RECOMMENDATIONS AND DISCUSSION:

In this 37-year-old woman presented with multiple complex medical issues, we will

monitor the patient closely along with Neurology and as well as Psychiatry.  Keppra has

been initiated.  EEG.  Monitor closely.  Prognosis is guarded.  Further recommendations

to follow.





MMODL / IJN: [id number] / Job#: [id number]

## 2023-06-22 NOTE — EEG
ELECTROENCEPHALOGRAM REPORT



CLINICAL HISTORY:

This is a 37-year-old woman, who presented to the ED because of seizure-like 
activity

on 06/21/2023 witnessed by her son.  The video EEG is obtained to evaluate for 
seizure

epileptiform activity.



RELEVANT MEDICATION:

The patient is not on any antiseizure medication.



EEG TYPE:

A routine 21-channel EEG is performed with video using the 10/20 electrode 
placement

system.



DESCRIPTION:

Wakefulness is only obtained.  During awake state, the posterior-dominant rhythm

consists of low-to-moderate voltage of 10 hertz activity that is well modulated 
and

well sustained.  There is no physiological sleep architecture seen.

.

There is no focal slowing.



Interictal and ictal is none.



ACTIVATION PROCEDURE:

Photic stimulation did not evoke a posterior driving response.  There is no 
abnormality

during the photic stimulation.



Hyperventilation is not performed.



CLINICAL INTERPRETATION:

This is a normal routine EEG.  There is no focal slowing, epileptiform 
discharges, or

seizure on the EEG.  A normal routine EEG does not rule out underlying epilepsy.

Consider pursuing sleep deprived EEG or long-term EEG for further evaluation.

Clinical correlation is recommended



MMODL / IJN: [id number] / Job#: [id number]

[facility] CenterTEJINDER

## 2023-06-22 NOTE — P.CNNES
History of Present Illness


Consult date: 23


Requesting physician: Bob Garcia


Reason for Consult: seizure


History of Present Illness: 


This is a 37-year-old woman with history of depression, anxiety who presented 

because of seizure-like activity.  Patient's  is at bedside with helps 

with some of the history.  It seems yesterday in the afternoon around 2H patient

was sitting on a couch and the she had witnessed seizure-like activity by her 

9-year-old son.  According to  and her son described as she was having 

shaking of her extremities unknown obturation and was unresponsive and the she 

was making weird noises the son appreciated.  Then was post ictal confusion for 

couple minutes.  Patient had a tongue bite on the left side.  She denies any 

urinary or bowel incontinence.  She denied any warning the signs prior to the 

episode.  She said for the past 1 year she's been having intermittent episode of

hand jerk that her hand flails and she drops objects and then she would be 

confused after the episode briefly again she's been having it for the past 1 

year but is becoming more frequent for the past 2-3 months.  She denies any 

diagnosis of seizure in the past.  She denies any seizure as a child.  She is on

Wellbutrin and she stated she's been on it for a long time.  Denies any alcohol 

use or any illicit drug use.


Currently she has a mild headache throughout the head and she states is 2-3 

feels dull.  Denies any vomiting or or nausea denies any focal weakness or 

visual disturbance.


She does have family history of seizure and her father had brain cancer in his 

30s and a result .  He has seizure from mod as a result of his brain cancer.





She states that her birth history was normal and was vaginal no complication to 

her knowledge.





Some of the workup during his hospital visit consisted of:


Temperature has been within normal limits.


Initial white blood cells 14.0 thousand and repeated is normal.


Chemistry panel is within normal limits.


CT of the head is reported as no acute intracranial process.  I personally 

reviewed the CT report


Urine drug screen is positive for benzos otherwise rest is not detected.








Review of Systems


Review of system:  The 12 point system was reviewed and apparent positive and 

negative per HPI.








Past Medical History


Past Medical History: GERD/Reflux, Musculoskeletal Disorder


Additional Past Medical History / Comment(s): FRACTURES 3,4,5 TOES RT FOOT.


History of Any Multi-Drug Resistant Organisms: MRSA


Date of last positivie culture/infection: 


MDRO Source:: right groin


Past Surgical History: Orthopedic Surgery, Tubal Ligation


Additional Past Surgical History / Comment(s): reconstrution of rt foot


Past Anesthesia/Blood Transfusion Reactions: Family History of Problems w/ 

Anesthesia


Additional Past Anesthesia/Blood Transfusion Reaction / Comment(s): MOTHER HAS 

SEVERE PONV.


Past Psychological History: Anxiety, Depression


Smoking Status: Never smoker





- Past Family History


  ** Father


Family Medical History: Cancer





  ** Mother


Additional Family Medical History / Comment(s): hypoglycemia





Medications and Allergies


                                Home Medications











 Medication  Instructions  Recorded  Confirmed  Type


 


buPROPion XL [Wellbutrin XL] 150 mg PO HS 10/31/22 06/21/23 History


 


Albuterol Inhaler [Ventolin Hfa 1 - 2 puff INHALATION RT-Q6H PRN 23 History





Inhaler]    


 


Cetirizine HCl 10 mg PO HS 23 History


 


FLUoxetine HCL [PROzac] 40 mg PO HS 23 History


 


Montelukast [Singulair] 10 mg PO HS 23 History


 


Semaglutide [Wegovy] 0.5 mg SQ FR 23 History








                                    Allergies











Allergy/AdvReac Type Severity Reaction Status Date / Time


 


sulfamethoxazole Allergy  Rash/Hives Verified 23 19:51





[From Bactrim]     


 


trimethoprim [From Bactrim] Allergy  Rash/Hives Verified 23 19:51


 


latex AdvReac  Itching Verified 23 19:51


 


nitrofurantoin AdvReac  numbness,ti Verified 23 19:51





[From Macrobid]   ngling  


 


nitrofurantoin AdvReac  numbness,ti Verified 23 19:51





macrocrystalline   ngling  





[From Macrobid]     














Physical Examination





- Vital Signs


Vital Signs: 


                                   Vital Signs











  Temp Pulse Pulse Resp BP BP Pulse Ox


 


 23 08:00    84  15   


 


 23 07:10  97.6 F   84  15   134/82  100


 


 23 01:45  98.3 F   74  16   143/84  98


 


 23 22:50  98.2 F   94  17   149/97  99


 


 23 22:19  98.5 F  97   16  109/60   98


 


 23 17:38  98.6 F  108 H   18  146/90   98


 


 23 15:11  97.4 F L  117 H   20  161/87   99








                                Intake and Output











 23





 22:59 06:59 14:59


 


Intake Total   118


 


Balance   118


 


Intake:   


 


  Oral   118


 


Other:   


 


  Voiding Method  Toilet Toilet


 


  # Voids  1 


 


  Weight 117.934 kg  











GENERAL: The patient is lying in bed and is not in acute distress.





NEUROLOGICAL:


Higher mental function: The patient is awake, alert, oriented to self, place and

time.  Patient is following commands.  No aphasia and no neglect.


Cranial nerves: The pupils are round, equal and reactive to light and 

accommodation.  Visual fields are full to confrontation throughout.  Extraocular

movement is intact no nystagmus is noted.  Facial sensation is normal to touch 

throughout.  The facial strength is normal throughout.  Hearing is normal rosa

aterally to hand rub.  Tongue is midline and moved side-to-side without any 

difficulty.   Has left mid/posterior tongue bite laterally. No dysarthria is 

noted.  Shoulder shrug is normal bilaterally.


Motor: The strength is 5 over 5 throughout.  Normal tone and bulk.  


Cerebellum: Normal finger to nose bilaterally.


Sensation: Sensation is normal to touch throughout.


Reflexes (right/left): 2+ throughout.


Plantars are downgoing bilaterally. 








Results





- Laboratory Findings


CBC and BMP: 


                                 23 06:51





                                 23 06:51


Abnormal Lab Findings: 


                                  Abnormal Labs











  23





  18:29 18:29


 


WBC  14.0 H 


 


Neutrophils #  12.1 H 


 


U Benzodiazepines Scrn   Detected H














Assessment and Plan


Assessment: 


This is an 37-year-old woman with history of depression and anxiety who 

presented that because of seizure-like activity witnessed by the her son on 

2023.  She had that shaking of extremities unresponsive with the left 

lateral tongue and post ictal confusion.  Patient has been having current 

episode of bilateral hand jerks and would drop objects for the past 1 year but 

been more frequent for the last 2-3 month period.





New onset seizure (and her episodes of hand jerks with brief confusion for the 

past one year but more frequent for past 2-3 months are likely seizures).


History of depression


History of anxiety


Family history of brain cancer (father in his 30's and result had seizure and 

 at young age)





Plan: 


I ordered a routine EEG


Ordered MRI of the brain with and without to rule out any brain mass especially 

with the Young family history of brain cancer


Placed the patient on Keppra 500 mg 1 tablet twice a day and notified the 

patient about the side effects of mood/behavior changes.  If she does then will 

switch to possible Lamictal (which is slow titration) or Vimpat.


On seizure precautions seizure pads


I consulted psychiatry team to modify her psychiatric medication and I recommend

to avoid Wellbutrin since the lowers threshold of seizures.


Patient was notified that per [place] DM because of her seizures, to avoid 

driving for 6 month until seizure-free, avoid heights, avoid swimming 

unassisted, avoid heavy machinery.


We'll defer the rest of the medical management to primary team


Upon discharge recommend the patient to follow-up with a neurologist as an 

outpatient within 1-2 weeks.





Plan discussed with the patient and her  was at bedside


Thank you for the consultation





Time with Patient: Greater than 30

## 2023-06-23 VITALS — HEART RATE: 81 BPM | DIASTOLIC BLOOD PRESSURE: 88 MMHG | TEMPERATURE: 97.7 F | SYSTOLIC BLOOD PRESSURE: 142 MMHG

## 2023-06-23 VITALS — RESPIRATION RATE: 18 BRPM

## 2023-06-23 RX ADMIN — CEFAZOLIN SCH MLS/HR: 330 INJECTION, POWDER, FOR SOLUTION INTRAMUSCULAR; INTRAVENOUS at 04:19

## 2023-06-23 NOTE — MR
PRE AND POSTCONTRAST ENHANCED MRI OF THE BRAIN:

 

CLINICAL HISTORY: seizure. family hx of brain cancer in 30's 

 

CONTRAST: Gadavist 12ml

 

Multiplanar and multispin-echo imaging of the brain was performed both before and after the administr
ation of contrast. 

 

The ventricles, basal cisterns and sulci overlying the cerebral convexities are within normal limits.
  

 

There is no evidence for midline shift or mass effect.  

 

Acute intracranial hemorrhage or extra-axial collection is not evident.  

 

There are no abnormal areas of increased or decreased signal intensity within the brain parenchyma.  


 

Following contrast administration, there is no evidence for pathologic enhancement or enhancing mass.
  

 

The paranasal sinuses and mastoid air cells are well-aerated.

 

IMPRESSION:    

 

Unremarkable pre and postcontrast enhanced MRI of the brain.

## 2023-06-23 NOTE — P.PN
Subjective


Progress Note Date: 23


The patient seen at bedside and is accompanied with her .  No further 

seizure-like activity.  She feels back to baseline.








Objective





- Vital Signs


Vital signs: 


                                   Vital Signs











Temp  97.5 F L  23 07:00


 


Pulse  75   23 08:00


 


Resp  18   23 08:00


 


BP  108/68   23 07:00


 


Pulse Ox  98   23 07:00


 


FiO2      








                                 Intake & Output











 23





 18:59 06:59 18:59


 


Intake Total 118  318


 


Balance 118  318


 


Intake:   


 


  Oral 118  318


 


Other:   


 


  Voiding Method Toilet Toilet Toilet


 


  # Voids 2 2 














- Exam


GENERAL: The patient is lying in bed and is not in acute distress.





NEUROLOGICAL:


Higher mental function: The patient is awake, alert, oriented to self, place and

time.  Patient is following commands.  No aphasia and no neglect.


Cranial nerves: The pupils are round, equal and reactive to light and 

accommodation.  Visual fields are full to confrontation throughout.  Extraocular

movement is intact no nystagmus is noted.  Facial sensation is normal to touch 

throughout.  The facial strength is normal throughout.  Hearing is normal bilate

rally to hand rub.  Tongue is midline and moved side-to-side without any 

difficulty.   Has left mid/posterior tongue bite laterally. No dysarthria is 

noted.  Shoulder shrug is normal bilaterally.


Motor: The strength is 5 over 5 throughout.  Normal tone and bulk.  


Cerebellum: Normal finger to nose bilaterally.


Sensation: Sensation is normal to touch throughout.


Reflexes (right/left): 2+ throughout.


Plantars are downgoing bilaterally. 





Some of the workup during his hospital visit consisted of:


Temperature has been within normal limits.


Initial white blood cells 14.0 thousand and repeated is normal.


Chemistry panel is within normal limits.


CT of the head is reported as no acute intracranial process.  I personally 

reviewed the CT report


Urine drug screen is positive for benzos otherwise rest is not detected.


Routine EEG is normal.  There is no focal slowing, epileptiform discharges or 

seizure on the EEG.  A normal routine EEG does not rule out underlying epilepsy.

 Recommend pursuing sleep deprived EEG or long term EEG for further evaluation.


MRI of the brain with and without is reported as unremarkable pre-and post 

contrast enhanced MRI the brain.  I personally reviewed the MRI and agree with 

the report.








- Labs


CBC & Chem 7: 


                                 23 06:51





                                 23 06:51





Assessment and Plan


Assessment: 


This is an 37-year-old woman with history of depression and anxiety who 

presented that because of seizure-like activity witnessed by the her son on 

2023.  She had that shaking of extremities unresponsive with the left 

lateral tongue and post ictal confusion.  Patient has been having current 

episode of bilateral hand jerks and would drop objects for the past 1 year but 

been more frequent for the last 2-3 month period.





New onset seizure (and her episodes of hand jerks with brief confusion for the 

past one year but more frequent for past 2-3 months are likely seizures).---no 

further seizure since this admission.


History of depression


History of anxiety


Family history of brain cancer (father in his 30's and result had seizure and 

 at young age)





Plan: 


Routine EEG and MRI the brain with and without R normal.  I recommend a sleep 

deprived EEG or long-term EEG as an outpatient for further evaluation.


Continue Keppra 500 mg 1 tablet twice a day and notified the patient about the 

side effects of mood/behavior changes.  If she does then will switch to possible

Lamictal (which is slow titration) or Vimpat.


On seizure precautions seizure pads


Psychiatry team to modify her psychiatric medication and I recommend to avoid 

Wellbutrin since the lowers threshold of seizures.  Wellbutrin was stopped.


Patient was notified that per [place] DM because of her seizures, to avoid 

driving for 6 month until seizure-free, avoid heights, avoid swimming unassi

sted, avoid heavy machinery.


We'll defer the rest of the medical management to primary team


Upon discharge recommend the patient to follow-up with a neurologist as an 

outpatient within 1-2 weeks.





Plan discussed with the patient and her  was at bedside


No additional neurological workup is needed.  Please notify neurology team of 

any further concerns.


Time with Patient: Less than 30

## 2023-06-23 NOTE — P.DS
Providers


Date of admission: 


06/21/23 21:38





Attending physician: 


Vikram Adams





Consults: 





                                        





06/21/23 21:38


Consult Physician Routine 


   Consulting Provider: Be Dalal


   Consult Reason/Comments: sz


   Do you want consulting provider notified?: Yes





06/22/23 11:44


Consult Physician Routine 


   Consulting Provider: Lewis Wolff


   Consult Reason/Comments: need modification of medication Wellbutrin etc with 

hx seizure


   Do you want consulting provider notified?: Yes











Primary care physician: 


Robert King





Hospital Course: 





Diagnoses:


New onset seizure


Depression, 


History of GERD


Morbid obesity with BMI of 46.1








Hospital course:


This is a pleasant 37 years old female who presents because of seizure.  Patient

had significant pain which was unremarkable for acute process.  Patient 

tolerated by neurologist


MRI of the brain obtained today was negative for acute process.


Patient developed by psychiatrist, Wellbutrin was discontinued, patient informed

with risks of increased seizure chances explant and she verbalized understanding

and acceptance to discontinue Wellbutrin.


Prescription for give provided for her.


Denies any other symptoms.


Patient is eager to go home today.   at bedside


Patient was cleared for discharge by neurologist.


Problems and management plan were discussed with the patient and he verbalized 

understanding and acceptance


Patient was found stable and can be discharged home in guarded prognosis however

he needs follow-up as an outpatient. Patient was instructed to follow up with 

PCP within one week and patient agrees








Physical exam


Gen: patient is a AAOx3, no distress


CVS: S1-S2, RRR, no murmur


Lungs: B/L CTA, no wheezing


Abdomen: soft, no distention, no tenderness, positive bowel sounds


Extremity: no leg edema or induration





Time spent more than 35 minutes





Patient Condition at Discharge: Good





Plan - Discharge Summary


New Discharge Prescriptions: 


New


   levETIRAcetam [Keppra] 500 mg PO Q12HR 30 Days #60 tab





Continue


   Montelukast [Singulair] 10 mg PO HS


   FLUoxetine HCL [PROzac] 40 mg PO HS


   Albuterol Inhaler [Ventolin Hfa Inhaler] 1 - 2 puff INHALATION RT-Q6H PRN


     PRN Reason: Shortness Of Breath


   Semaglutide [Wegovy] 0.5 mg SQ FR


   Cetirizine HCl 10 mg PO HS





Discontinued


   buPROPion XL [Wellbutrin XL] 150 mg PO HS


Discharge Medication List





Albuterol Inhaler [Ventolin Hfa Inhaler] 1 - 2 puff INHALATION RT-Q6H PRN 

06/21/23 [History]


Cetirizine HCl 10 mg PO HS 06/21/23 [History]


FLUoxetine HCL [PROzac] 40 mg PO HS 06/21/23 [History]


Montelukast [Singulair] 10 mg PO HS 06/21/23 [History]


Semaglutide [Wegovy] 0.5 mg SQ FR 06/21/23 [History]


levETIRAcetam [Keppra] 500 mg PO Q12HR 30 Days #60 tab 06/23/23 [Rx]








Follow up Appointment(s)/Referral(s): 


Robert King MD [Primary Care Provider] - 1-2 days


Lilliam Millan MD [Medical Doctor] - 1 Week (neurologist)


Chantelle Singh MD [REFERRING] - 2 Weeks (neurologist)


Patient Instructions/Handouts:  Seizure/Epilepsy Discharge Instructions & 

Follow-Up


Activity/Diet/Wound Care/Special Instructions: 


heart healthy diet 





activity is restricted till you see your doctor 








we recommend a sleep deprived EEG or long-term EEG as an outpatient for further 

evaluation.with your neurologist as outpatient 


Discharge Disposition: HOME SELF-CARE